# Patient Record
Sex: MALE | Race: WHITE | Employment: OTHER | URBAN - METROPOLITAN AREA
[De-identification: names, ages, dates, MRNs, and addresses within clinical notes are randomized per-mention and may not be internally consistent; named-entity substitution may affect disease eponyms.]

---

## 2020-07-30 ENCOUNTER — HOSPITAL ENCOUNTER (EMERGENCY)
Age: 85
Discharge: HOME | End: 2020-07-30
Payer: MEDICARE

## 2020-07-30 VITALS
SYSTOLIC BLOOD PRESSURE: 178 MMHG | TEMPERATURE: 98.2 F | WEIGHT: 160 LBS | DIASTOLIC BLOOD PRESSURE: 67 MMHG | HEART RATE: 63 BPM | OXYGEN SATURATION: 99 % | BODY MASS INDEX: 23.7 KG/M2 | RESPIRATION RATE: 20 BRPM | HEIGHT: 69 IN

## 2020-07-30 PROCEDURE — 10120 INC&RMVL FB SUBQ TISS SMPL: CPT

## 2020-07-30 PROCEDURE — 2500000003 HC RX 250 WO HCPCS: Performed by: NURSE PRACTITIONER

## 2020-07-30 PROCEDURE — 99213 OFFICE O/P EST LOW 20 MIN: CPT

## 2020-07-30 PROCEDURE — 96372 THER/PROPH/DIAG INJ SC/IM: CPT

## 2020-07-30 RX ORDER — CEPHALEXIN 500 MG/1
500 CAPSULE ORAL 4 TIMES DAILY
Qty: 28 CAPSULE | Refills: 0 | Status: SHIPPED | OUTPATIENT
Start: 2020-07-30 | End: 2020-08-06

## 2020-07-30 RX ORDER — METOPROLOL SUCCINATE 25 MG/1
25 TABLET, EXTENDED RELEASE ORAL DAILY
COMMUNITY

## 2020-07-30 RX ORDER — DORZOLAMIDE HYDROCHLORIDE AND TIMOLOL MALEATE PRESERVATIVE FREE 20; 5 MG/ML; MG/ML
SOLUTION/ DROPS OPHTHALMIC
COMMUNITY

## 2020-07-30 RX ORDER — ATORVASTATIN CALCIUM 40 MG/1
40 TABLET, FILM COATED ORAL DAILY
COMMUNITY

## 2020-07-30 RX ORDER — LIDOCAINE HYDROCHLORIDE 10 MG/ML
5 INJECTION, SOLUTION INFILTRATION; PERINEURAL ONCE
Status: COMPLETED | OUTPATIENT
Start: 2020-07-30 | End: 2020-07-30

## 2020-07-30 RX ORDER — LISINOPRIL 20 MG/1
20 TABLET ORAL DAILY
COMMUNITY

## 2020-07-30 RX ORDER — ALLOPURINOL 300 MG/1
300 TABLET ORAL DAILY
COMMUNITY

## 2020-07-30 RX ORDER — ASPIRIN 81 MG/1
81 TABLET ORAL DAILY
COMMUNITY

## 2020-07-30 RX ADMIN — LIDOCAINE HYDROCHLORIDE 5 ML: 10 INJECTION, SOLUTION INFILTRATION; PERINEURAL at 17:28

## 2020-07-30 ASSESSMENT — PAIN SCALES - GENERAL: PAINLEVEL_OUTOF10: 0

## 2020-07-30 NOTE — ED NOTES
Wound cleansed with NSS. Triple antibiotic ointment and Bandaid applied.      JOHANNA Acevedo  99/49/02 [id number]

## 2020-08-01 NOTE — ED PROVIDER NOTES
Department of Emergency Medicine  [address] [place] [address]  Provider Note  Admit Date/Time: 7/30/2020  4:45 PM  Room: 06/06  MRN: [id number]  Chief Complaint: Foreign Body in Skin (Has wooden splinter from fence post under finger nail on left 3rd finger)       History of Present Illness   Source of history provided by:  patient. History/Exam Limitations: none. Evelyn Liz is a 80 y.o. male who has a past medical history of:   Past Medical History:   Diagnosis Date    CAD (coronary artery disease)     Gout     Hyperlipidemia     Hypertension     presents to the urgent care center by private car for a splinter under the fingernail on his left third finger. This happened just prior to arrival.  He said he just put his hand on a fence post and somehow it went into his finger. He was not able to get it out so he came in for evaluation. He has no other complaints or problems    ROS    Pertinent positives and negatives are stated within HPI, all other systems reviewed and are negative. Past Surgical History:   Procedure Laterality Date    COLONOSCOPY      CORONARY ANGIOPLASTY WITH STENT PLACEMENT      EYE SURGERY      IR ANGXPTA FEM POP W STENT      PROSTATE BIOPSY      ROTATOR CUFF REPAIR     Social History:  reports that he has never smoked. He has never used smokeless tobacco.  Family History: family history is not on file. Allergies: Patient has no known allergies. Physical Exam   Oxygen Saturation Interpretation: Normal.   ED Triage Vitals [07/30/20 1648]   BP Temp Temp Source Pulse Resp SpO2 Height Weight   (!) 178/67 98.2 °F (36.8 °C) Infrared 63 20 99 % 5' 8.5\" (1.74 m) 160 lb (72.6 kg)       Physical Exam  · Constitutional/General: Alert and oriented x3, well appearing, non toxic in NAD  · HEENT:  NC/NT.,  Airway patent. · Neck: Supple, full ROM,   · Respiratory: . Not in respiratory distress  · CV:  Regular rate. Regular rhythm.      · Musculoskeletal: Moves all extremities x 4.   · Integument: skin warm and dry. No rashes. He does have a splinter lodged under the nail on the left third finger. · Lymphatic: no lymphadenopathy noted  · Neurologic: GCS 15, no focal deficits, symmetric strength 5/5 in the upper and lower extremities bilaterally  · Psychiatric: Normal Affect    Lab / Imaging Results   (All laboratory and radiology results have been personally reviewed by myself)  Labs:  No results found for this visit on 07/30/20. Imaging: All Radiology results interpreted by Radiologist unless otherwise noted. No orders to display       ED Course / Medical Decision Making     Medications   lidocaine 1 % injection 5 mL (5 mLs Intradermal Given 7/30/20 1728)            MDM:   Patient here for evaluation of a splinter under his nail. Is not able to get it out. PROCEDURE  8/1/20       Time: 1700    FOREIGN BODY REMOVAL  Risks, benefits and alternatives (for applicable procedures below) described. Performed By: THANG Keenan CNP. Indication:  splinter. Location:   Subungual left third finger . Informed consent obtained: The patient provided verbal consent for this procedure. .  Prep: The skin was cleansed with povidone iodine and draped in a sterile fashion. Anesthetic: The wound area was anesthetized with Lidocaine 1% without epinephrine. -- by digital block. Foreign Body was: A wedge was cut into the nail and the splinter was removed with splinter forceps removed using splinter forceps and had the appearance of wood. Ultrasound Guidance:   not used. Complications:  None. Patient tolerated the procedure well. Advised him if he develops any redness drainage or swelling at the site he needs to get reevaluated right away he should follow-up with his doctor for wound recheck I did put him on Keflex. He said he had a tetanus shot 3 years ago so that did not need updated.   The wound was thoroughly irrigated after the procedure, and antibiotic ointment and a dressing was applied by the LPN        Counseling:    I have  spoken with the patient and discussed todays results, in addition to providing specific details for the plan of care and counseling regarding the diagnosis and prognosis. Questions are answered at this time and they are agreeable with the plan. Assessment      1. Splinter    2. FOREIGN BODY REMOVAL  Plan   Discharge to home and advised to contact Kevin Garcia MD  [address]. [facility] [phone]    Schedule an appointment as soon as possible for a visit in 1 day  For wound re-check   Patient condition is good    New Medications     Discharge Medication List as of 7/30/2020  6:07 PM      START taking these medications    Details   cephALEXin (KEFLEX) 500 MG capsule Take 1 capsule by mouth 4 times daily for 7 days, Disp-28 capsule,R-0Print           Electronically signed by THANG Kelly CNP   DD: 8/1/20  **This report was transcribed using voice recognition software. Every effort was made to ensure accuracy; however, inadvertent computerized transcription errors may be present.   END OF ED PROVIDER NOTE     THANG Kelly CNP  08/01/20 2790

## 2021-01-29 ENCOUNTER — IMMUNIZATION (OUTPATIENT)
Dept: PRIMARY CARE CLINIC | Age: 86
End: 2021-01-29
Payer: MEDICARE

## 2021-01-29 PROCEDURE — 91301 COVID-19, MODERNA VACCINE 100MCG/0.5ML DOSE: CPT | Performed by: PHYSICIAN ASSISTANT

## 2021-01-29 PROCEDURE — 0011A COVID-19, MODERNA VACCINE 100MCG/0.5ML DOSE: CPT | Performed by: PHYSICIAN ASSISTANT

## 2021-02-26 ENCOUNTER — IMMUNIZATION (OUTPATIENT)
Dept: PRIMARY CARE CLINIC | Age: 86
End: 2021-02-26
Payer: MEDICARE

## 2021-02-26 PROCEDURE — 91301 COVID-19, MODERNA VACCINE 100MCG/0.5ML DOSE: CPT | Performed by: PHYSICIAN ASSISTANT

## 2021-02-26 PROCEDURE — 0012A COVID-19, MODERNA VACCINE 100MCG/0.5ML DOSE: CPT | Performed by: PHYSICIAN ASSISTANT

## 2021-11-30 ENCOUNTER — IMMUNIZATION (OUTPATIENT)
Dept: PRIMARY CARE CLINIC | Age: 86
End: 2021-11-30
Payer: MEDICARE

## 2021-11-30 PROCEDURE — 0064A COVID-19, MODERNA BOOSTER VACCINE 0.25ML DOSE: CPT | Performed by: STUDENT IN AN ORGANIZED HEALTH CARE EDUCATION/TRAINING PROGRAM

## 2021-11-30 PROCEDURE — 91306 COVID-19, MODERNA BOOSTER VACCINE 0.25ML DOSE: CPT | Performed by: STUDENT IN AN ORGANIZED HEALTH CARE EDUCATION/TRAINING PROGRAM

## 2022-07-18 ENCOUNTER — TELEPHONE (OUTPATIENT)
Dept: ADMINISTRATIVE | Age: 87
End: 2022-07-18

## 2022-07-18 NOTE — TELEPHONE ENCOUNTER
Pt's wife Soraya Garcia called to see if and when they can get in for referral Unspecified hearing loss. Unspecified ear. Wife stated she feels wax is stuck in the eardrum. Is this referral ok to schedule?

## 2022-08-17 ENCOUNTER — OFFICE VISIT (OUTPATIENT)
Dept: ENT CLINIC | Age: 87
End: 2022-08-17
Payer: MEDICARE

## 2022-08-17 ENCOUNTER — PROCEDURE VISIT (OUTPATIENT)
Dept: AUDIOLOGY | Age: 87
End: 2022-08-17
Payer: MEDICARE

## 2022-08-17 VITALS
HEIGHT: 69 IN | SYSTOLIC BLOOD PRESSURE: 181 MMHG | BODY MASS INDEX: 21.37 KG/M2 | HEART RATE: 54 BPM | WEIGHT: 144.3 LBS | DIASTOLIC BLOOD PRESSURE: 65 MMHG

## 2022-08-17 DIAGNOSIS — H90.3 SENSORINEURAL HEARING LOSS (SNHL) OF BOTH EARS: Primary | ICD-10-CM

## 2022-08-17 DIAGNOSIS — H91.8X9 ASYMMETRICAL HEARING LOSS: ICD-10-CM

## 2022-08-17 DIAGNOSIS — H69.83 DYSFUNCTION OF BOTH EUSTACHIAN TUBES: ICD-10-CM

## 2022-08-17 DIAGNOSIS — H90.3 ASYMMETRIC SNHL (SENSORINEURAL HEARING LOSS): Primary | ICD-10-CM

## 2022-08-17 PROCEDURE — 69210 REMOVE IMPACTED EAR WAX UNI: CPT | Performed by: OTOLARYNGOLOGY

## 2022-08-17 PROCEDURE — 92557 COMPREHENSIVE HEARING TEST: CPT | Performed by: AUDIOLOGIST

## 2022-08-17 PROCEDURE — 92567 TYMPANOMETRY: CPT | Performed by: AUDIOLOGIST

## 2022-08-17 PROCEDURE — 1123F ACP DISCUSS/DSCN MKR DOCD: CPT | Performed by: OTOLARYNGOLOGY

## 2022-08-17 PROCEDURE — 1036F TOBACCO NON-USER: CPT | Performed by: OTOLARYNGOLOGY

## 2022-08-17 PROCEDURE — G8420 CALC BMI NORM PARAMETERS: HCPCS | Performed by: OTOLARYNGOLOGY

## 2022-08-17 PROCEDURE — G8427 DOCREV CUR MEDS BY ELIG CLIN: HCPCS | Performed by: OTOLARYNGOLOGY

## 2022-08-17 PROCEDURE — 99204 OFFICE O/P NEW MOD 45 MIN: CPT | Performed by: OTOLARYNGOLOGY

## 2022-08-17 RX ORDER — APIXABAN 5 MG/1
1 TABLET, FILM COATED ORAL 2 TIMES DAILY
COMMUNITY
Start: 2022-07-25

## 2022-08-17 RX ORDER — M-VIT,TX,IRON,MINS/CALC/FOLIC 27MG-0.4MG
1 TABLET ORAL DAILY
COMMUNITY

## 2022-08-17 ASSESSMENT — ENCOUNTER SYMPTOMS
CHOKING: 0
SINUS PAIN: 0
SINUS PRESSURE: 0
TROUBLE SWALLOWING: 0
VOICE CHANGE: 0
RHINORRHEA: 0
SORE THROAT: 0
GASTROINTESTINAL NEGATIVE: 1
WHEEZING: 0
COUGH: 0
COLOR CHANGE: 0
STRIDOR: 0

## 2022-08-17 ASSESSMENT — VISUAL ACUITY: OU: 1

## 2022-08-17 NOTE — PROGRESS NOTES
This patient was referred for audiometric/tympanometric testing by Dr. Ananda Figueredo due to hearing loss. 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 Asymmetric hearing noted and discussed with physician. Reliability was good. Speech reception thresholds were in good agreement with the pure tone averages, bilaterally. Speech discrimination scores were fair, bilaterally. Tympanometry revealed normal middle ear peak pressure and compliance, in the left ear. Right ear revealed shallow tympanogram ( 0.2ml)     The results were reviewed with the patient and physician. Recommendations for follow up will be made pending physician consult.     Rodríguez Henriquez/CCC-A  [place] Lic # [id number]

## 2022-08-17 NOTE — PROGRESS NOTES
[address] [facility] Otolaryngology  Dr. Marisa Rodríguez. ANA De Los Santos Ms.Ed. New Consult       Patient Name:  Nina Bills  :  1930     CHIEF C/O:    Chief Complaint   Patient presents with    Hearing Problem     Right side per pt is plugged       HISTORY OBTAINED FROM:  patient    HISTORY OF PRESENT ILLNESS:       Mike Garcia is a 80y.o. year old male, here today for hearing evaluation. Pt reports difficulty hearing. Routinely gets his ears cleaned by PCP. Recently noticed that even after cleaning hearing has been worse. 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 Denies dizziness, denies visual changes, denies drainage from ears. Never smoker.        Past Medical History:   Diagnosis Date    CAD (coronary artery disease)     Gout     Hyperlipidemia     Hypertension      Past Surgical History:   Procedure Laterality Date    COLONOSCOPY      CORONARY ANGIOPLASTY WITH STENT PLACEMENT      EYE SURGERY      IR ANGXPTA FEM POP W STENT      PROSTATE BIOPSY      ROTATOR CUFF REPAIR         Current Outpatient Medications:     ELIQUIS 5 MG TABS tablet, Take 1 tablet by mouth in the morning and at bedtime, Disp: , Rfl:     Multiple Vitamins-Minerals (THERAPEUTIC MULTIVITAMIN-MINERALS) tablet, Take 1 tablet by mouth daily, Disp: , Rfl:     aspirin 81 MG EC tablet, Take 81 mg by mouth daily, Disp: , Rfl:     allopurinol (ZYLOPRIM) 300 MG tablet, Take 300 mg by mouth daily, Disp: , Rfl:     metoprolol succinate (TOPROL XL) 25 MG extended release tablet, Take 25 mg by mouth daily, Disp: , Rfl:     lisinopril (PRINIVIL;ZESTRIL) 20 MG tablet, Take 20 mg by mouth daily, Disp: , Rfl:     atorvastatin (LIPITOR) 40 MG tablet, Take 40 mg by mouth daily, Disp: , Rfl:     Dorzolamide HCl-Timolol Mal PF 2-0.5 % SOLN, Apply to eye, Disp: , Rfl:     brimonidine (ALPHAGAN P) 0.1 % SOLN, 1 drop every 8 hours, Disp: , Rfl:     bimatoprost (LUMIGAN) 0.01 % SOLN ophthalmic drops, Place 1 drop into both eyes nightly, Disp: , Rfl:   Patient has no known allergies. Social History     Tobacco Use    Smoking status: Never    Smokeless tobacco: Never   Substance Use Topics    Alcohol use: Yes     Comment: blue moon    Drug use: Never     No family history on file. Review of Systems   Constitutional:  Negative for activity change, fatigue and fever. HENT:  Positive for hearing loss. Negative for congestion, ear discharge, ear pain, nosebleeds, postnasal drip, rhinorrhea, sinus pressure, sinus pain, sore throat, tinnitus, trouble swallowing and voice change. Respiratory:  Negative for cough, choking, wheezing and stridor. Cardiovascular:  Negative for chest pain. Gastrointestinal: Negative. Genitourinary: Negative. Skin:  Negative for color change and rash. Neurological:  Negative for speech difficulty, light-headedness, numbness and headaches. Hematological:  Negative for adenopathy. Psychiatric/Behavioral:  Negative for behavioral problems. BP (!) 181/65 (Site: Left Upper Arm, Position: Sitting, Cuff Size: Medium Adult)   Pulse 54   Ht 5' 8.5\" (1.74 m)   Wt 144 lb 4.8 oz (65.5 kg)   BMI 21.62 kg/m²   Physical Exam  Constitutional:       Appearance: Normal appearance. He is normal weight. HENT:      Head: Normocephalic and atraumatic. Right Ear: Tympanic membrane, ear canal and external ear normal. No drainage. Left Ear: Tympanic membrane, ear canal and external ear normal. No drainage. There is impacted cerumen. Nose: Nose normal. No nasal deformity or septal deviation. Mouth/Throat:      Mouth: Mucous membranes are moist.   Eyes:      General: Lids are normal. Vision grossly intact. Extraocular Movements: Extraocular movements intact. Conjunctiva/sclera: Conjunctivae normal.      Pupils: Pupils are equal, round, and reactive to light. Cardiovascular:      Rate and Rhythm: Normal rate.    Pulmonary:      Effort: Pulmonary effort is normal.   Musculoskeletal:         General: Normal range of

## 2022-09-22 ENCOUNTER — TELEPHONE (OUTPATIENT)
Dept: ENT CLINIC | Age: 87
End: 2022-09-22

## 2022-09-22 NOTE — TELEPHONE ENCOUNTER
Received release of Information via fax from [facility] Lanette Frazier.), sent last audiology report electronically through Epic. DALE fax scanned into media.

## 2022-10-14 ENCOUNTER — IMMUNIZATION (OUTPATIENT)
Dept: PRIMARY CARE CLINIC | Age: 87
End: 2022-10-14
Payer: MEDICARE

## 2022-10-14 PROCEDURE — 91313 COVID-19, MODERNA BIVALENT BOOSTER, (AGE 18Y+), IM, 50 MCG/0.5 ML: CPT | Performed by: SURGERY

## 2022-10-14 PROCEDURE — 0134A COVID-19, MODERNA BIVALENT BOOSTER, (AGE 18Y+), IM, 50 MCG/0.5 ML: CPT | Performed by: SURGERY

## 2023-09-25 ENCOUNTER — OFFICE VISIT (OUTPATIENT)
Dept: ENT CLINIC | Age: 88
End: 2023-09-25
Payer: MEDICARE

## 2023-09-25 VITALS
BODY MASS INDEX: 20.26 KG/M2 | HEART RATE: 59 BPM | DIASTOLIC BLOOD PRESSURE: 89 MMHG | SYSTOLIC BLOOD PRESSURE: 141 MMHG | HEIGHT: 69 IN | WEIGHT: 136.8 LBS

## 2023-09-25 DIAGNOSIS — H61.23 BILATERAL IMPACTED CERUMEN: Primary | ICD-10-CM

## 2023-09-25 PROCEDURE — 69210 REMOVE IMPACTED EAR WAX UNI: CPT | Performed by: NURSE PRACTITIONER

## 2023-09-25 NOTE — PROGRESS NOTES
Subjective:      Patient ID:  Yaneli Botello is a 80 y.o. male. HPI:    Pt presents with a history of cerumen impaction removal.   The patients ear was last cleaned 1 year ago. The patient was not using ear drops to loosen wax immediately prior to this visit. Hearing aids: yes      Past Medical History:   Diagnosis Date    CAD (coronary artery disease)     Gout     Hyperlipidemia     Hypertension      Past Surgical History:   Procedure Laterality Date    COLONOSCOPY      CORONARY ANGIOPLASTY WITH STENT PLACEMENT      EYE SURGERY      IR ANGXPTA FEM POP W STENT      PROSTATE BIOPSY      ROTATOR CUFF REPAIR       History reviewed. No pertinent family history. Social History     Socioeconomic History    Marital status:      Spouse name: None    Number of children: None    Years of education: None    Highest education level: None   Tobacco Use    Smoking status: Never    Smokeless tobacco: Never   Substance and Sexual Activity    Alcohol use: Yes     Comment: blue moon    Drug use: Never     No Known Allergies    Review of Systems            Objective:     Vitals:    09/25/23 1425   BP: (!) 141/89   Pulse: 59     Physical Exam        Cerumen removal     Auditory canal(s) both ears partially obstructed with cerumen. A microscope was used . Cerumen was gently removed using soft plastic curette. Tympanic membranes are intact following the procedure. Auditory canals appear normal.            Assessment:       Diagnosis Orders   1. Bilateral impacted cerumen  DC REMOVAL IMPACTED CERUMEN INSTRUMENTATION UNILAT                 Plan:      Bilateral cerumen impactions removed without difficulty. Patient tolerated well. Patient has had audio performed within the last 1 year at his hearing aid provider and will continue to follow-up with them for further testing in the future.   He will otherwise follow-up in 1 year for repeat cerumen removal.  He will call for any new or worsening symptoms prior to his next

## 2023-10-07 ENCOUNTER — HOSPITAL ENCOUNTER (EMERGENCY)
Age: 88
Discharge: HOME | End: 2023-10-07
Payer: MEDICARE

## 2023-10-07 ENCOUNTER — APPOINTMENT (OUTPATIENT)
Dept: GENERAL RADIOLOGY | Age: 88
End: 2023-10-07
Payer: MEDICARE

## 2023-10-07 VITALS
BODY MASS INDEX: 19.48 KG/M2 | RESPIRATION RATE: 18 BRPM | WEIGHT: 130 LBS | DIASTOLIC BLOOD PRESSURE: 75 MMHG | SYSTOLIC BLOOD PRESSURE: 151 MMHG | HEART RATE: 54 BPM | TEMPERATURE: 99 F | OXYGEN SATURATION: 100 %

## 2023-10-07 DIAGNOSIS — M79.643 PAIN OF HAND, UNSPECIFIED LATERALITY: Primary | ICD-10-CM

## 2023-10-07 DIAGNOSIS — L03.119 CELLULITIS OF UPPER EXTREMITY, UNSPECIFIED LATERALITY: ICD-10-CM

## 2023-10-07 PROCEDURE — 99211 OFF/OP EST MAY X REQ PHY/QHP: CPT

## 2023-10-07 PROCEDURE — 73130 X-RAY EXAM OF HAND: CPT

## 2023-10-07 RX ORDER — CEPHALEXIN 500 MG/1
500 CAPSULE ORAL 3 TIMES DAILY
Qty: 21 CAPSULE | Refills: 0 | Status: SHIPPED | OUTPATIENT
Start: 2023-10-07 | End: 2023-10-14

## 2023-10-07 RX ORDER — CEPHALEXIN 500 MG/1
500 CAPSULE ORAL 3 TIMES DAILY
Qty: 21 CAPSULE | Refills: 0 | Status: SHIPPED | OUTPATIENT
Start: 2023-10-07 | End: 2023-10-07 | Stop reason: SDUPTHER

## 2023-10-07 RX ORDER — METHYLPREDNISOLONE 4 MG/1
TABLET ORAL
Qty: 21 TABLET | Refills: 0 | Status: SHIPPED | OUTPATIENT
Start: 2023-10-07 | End: 2023-10-07 | Stop reason: SDUPTHER

## 2023-10-07 RX ORDER — METHYLPREDNISOLONE 4 MG/1
TABLET ORAL
Qty: 21 TABLET | Refills: 0 | Status: SHIPPED | OUTPATIENT
Start: 2023-10-07

## 2023-10-07 ASSESSMENT — PAIN - FUNCTIONAL ASSESSMENT: PAIN_FUNCTIONAL_ASSESSMENT: NONE - DENIES PAIN

## 2023-10-07 NOTE — ED PROVIDER NOTES
rhythm. Right radial pulse normal capillary refill good movement of his fingers. Musculoskeletal: Moves all extremities x 4.'s of the right hand at the base of the thumb and the first 3 fingers is erythematous  and swollen. It is also warm to touch. Integument: skin warm and dry. No rashes. Lymphatic: no lymphadenopathy noted  Neurologic: GCS 15, no focal deficits, symmetric strength 5/5 in the upper and lower extremities bilaterally  Psychiatric: Normal Affect    Lab / Imaging Results   (All laboratory and radiology results have been personally reviewed by myself)  Labs:  No results found for this visit on 10/07/23. Imaging: All Radiology results interpreted by Radiologist unless otherwise noted. XR HAND RIGHT (MIN 3 VIEWS)   Final Result   No sign of acute fracture or dislocation      Moderate diffuse osteopenia. Mild degenerative changes as described above. ED Course / Medical Decision Making   Medications - No data to display       Consult(s):   None      MDM:   I did obtain an x-ray of his hand to rule out fracture and it was negative. His  hand is red and swollen and warm. He has  inflammation of the joints and cellulitis so I did put him on a Medrol Dosepak and some Keflex and advised him to follow-up with his doctor for recheck. Assessment      1. Pain of hand, unspecified laterality    2.  Cellulitis of upper extremity, unspecified laterality      Plan   Discharge to home and advised to contact Aftab Branch MD  [address] [place] [address] [phone]    Schedule an appointment as soon as possible for a visit      Patient condition is good    New Medications     Current Discharge Medication List        START taking these medications    Details   cephALEXin (KEFLEX) 500 MG capsule Take 1 capsule by mouth 3 times daily for 7 days  Qty: 21 capsule, Refills: 0      methylPREDNISolone (MEDROL, PRASHANT,) 4 MG tablet Take as directed on package insert days 1-6  Qty: 21

## 2025-05-04 ENCOUNTER — HOSPITAL ENCOUNTER (EMERGENCY)
Age: 89
Discharge: HOME | End: 2025-05-04
Attending: STUDENT IN AN ORGANIZED HEALTH CARE EDUCATION/TRAINING PROGRAM
Payer: MEDICARE

## 2025-05-04 ENCOUNTER — APPOINTMENT (OUTPATIENT)
Dept: CT IMAGING | Age: 89
End: 2025-05-04
Payer: MEDICARE

## 2025-05-04 VITALS
TEMPERATURE: 98.4 F | DIASTOLIC BLOOD PRESSURE: 71 MMHG | RESPIRATION RATE: 16 BRPM | OXYGEN SATURATION: 94 % | SYSTOLIC BLOOD PRESSURE: 139 MMHG | HEART RATE: 94 BPM

## 2025-05-04 DIAGNOSIS — S32.000A COMPRESSION FRACTURE OF LUMBAR VERTEBRA, UNSPECIFIED LUMBAR VERTEBRAL LEVEL, INITIAL ENCOUNTER (HCC): Primary | ICD-10-CM
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
## 2025-05-04 PROCEDURE — 72192 CT PELVIS W/O DYE: CPT

## 2025-05-04 PROCEDURE — 72131 CT LUMBAR SPINE W/O DYE: CPT

## 2025-05-04 PROCEDURE — 6370000000 HC RX 637 (ALT 250 FOR IP): Performed by: STUDENT IN AN ORGANIZED HEALTH CARE EDUCATION/TRAINING PROGRAM

## 2025-05-04 PROCEDURE — 99284 EMERGENCY DEPT VISIT MOD MDM: CPT

## 2025-05-04 RX ORDER — TRAMADOL HYDROCHLORIDE 50 MG/1
50 TABLET ORAL ONCE
Status: COMPLETED | OUTPATIENT
Start: 2025-05-04 | End: 2025-05-04

## 2025-05-04 RX ORDER — LIDOCAINE 4 G/G
1 PATCH TOPICAL DAILY
Status: DISCONTINUED | OUTPATIENT
Start: 2025-05-04 | End: 2025-05-04 | Stop reason: HOSPADM

## 2025-05-04 RX ORDER — TRAMADOL HYDROCHLORIDE 50 MG/1
50 TABLET ORAL ONCE
Status: DISCONTINUED | OUTPATIENT
Start: 2025-05-04 | End: 2025-05-04 | Stop reason: HOSPADM

## 2025-05-04 RX ORDER — TRAMADOL HYDROCHLORIDE 50 MG/1
50 TABLET ORAL EVERY 6 HOURS PRN
Qty: 12 TABLET | Refills: 0 | Status: SHIPPED | OUTPATIENT
Start: 2025-05-04 | End: 2025-05-07

## 2025-05-04 RX ORDER — LIDOCAINE 4 G/G
1 PATCH TOPICAL DAILY
Qty: 30 PATCH | Refills: 0 | Status: SHIPPED | OUTPATIENT
Start: 2025-05-04 | End: 2025-06-03

## 2025-05-04 RX ADMIN — TRAMADOL HYDROCHLORIDE 50 MG: 50 TABLET, COATED ORAL at 16:21

## 2025-05-04 ASSESSMENT — LIFESTYLE VARIABLES
HOW OFTEN DO YOU HAVE A DRINK CONTAINING ALCOHOL: NEVER
HOW MANY STANDARD DRINKS CONTAINING ALCOHOL DO YOU HAVE ON A TYPICAL DAY: PATIENT DOES NOT DRINK

## 2025-05-04 NOTE — ED PROVIDER NOTES
stent; Eye surgery; Colonoscopy; Rotator cuff repair; Prostate biopsy; and IR ANGIOPLASTY FEM POP W STENT.    Social History:  reports that he has never smoked. He has never used smokeless tobacco. He reports current alcohol use. He reports that he does not use drugs.    Family History: family history is not on file.     The patient’s home medications have been reviewed.    Allergies: Patient has no known allergies.    -------------------------------------------------- RESULTS -------------------------------------------------  Labs:  No results found for this visit on 05/04/25.    Radiology:  CT LUMBAR SPINE WO CONTRAST   Final Result   1. Mild compression deformity along the superior endplate of T12 could be   acute and there is minimal retropulsion into the spinal canal.   2. No acute osseous abnormality of the pelvis.   3. A large right inguinal hernia extending into the scrotum contains   nonobstructed bowel loops and the appendix.   4. 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      RECOMMENDATIONS:   Nonemergent vascular consultation.         CT PELVIS WO CONTRAST Additional Contrast? None   Final Result   1. Mild compression deformity along the superior endplate of T12 could be   acute and there is minimal retropulsion into the spinal canal.   2. No acute osseous abnormality of the pelvis.   3. A large right inguinal hernia extending into the scrotum contains   nonobstructed bowel loops and the appendix.   4. 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      RECOMMENDATIONS:   Nonemergent vascular consultation.             ------------------------- NURSING NOTES AND VITALS REVIEWED ---------------------------  Date / Time Roomed:  5/4/2025  3:57 PM  ED Bed Assignment:  19/19    The nursing notes within the ED

## 2025-05-05 ENCOUNTER — TELEPHONE (OUTPATIENT)
Dept: VASCULAR SURGERY | Age: 89
End: 2025-05-05

## 2025-05-05 ENCOUNTER — CLINICAL DOCUMENTATION (OUTPATIENT)
Dept: VASCULAR SURGERY | Age: 89
End: 2025-05-05

## 2025-05-05 PROBLEM — I72.3 ILIAC ARTERY ANEURYSM, BILATERAL: Status: ACTIVE | Noted: 2025-05-05

## 2025-05-05 PROBLEM — I71.43 INFRARENAL ABDOMINAL AORTIC ANEURYSM (AAA) WITHOUT RUPTURE: Status: ACTIVE | Noted: 2025-05-05

## 2025-05-05 ASSESSMENT — ENCOUNTER SYMPTOMS
DIARRHEA: 0
BACK PAIN: 1
CHEST TIGHTNESS: 0
VOMITING: 0
ABDOMINAL DISTENTION: 0
ABDOMINAL PAIN: 0
PHOTOPHOBIA: 0
SHORTNESS OF BREATH: 0
COUGH: 0
NAUSEA: 0

## 2025-05-05 NOTE — DISCHARGE INSTRUCTIONS
CT LUMBAR SPINE WO CONTRAST   Final Result   1. Mild compression deformity along the superior endplate of T12 could be   acute and there is minimal retropulsion into the spinal canal.   2. No acute osseous abnormality of the pelvis.   3. A large right inguinal hernia extending into the scrotum contains   nonobstructed bowel loops and the appendix.   4. 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      RECOMMENDATIONS:   Nonemergent vascular consultation.         CT PELVIS WO CONTRAST Additional Contrast? None   Final Result   1. Mild compression deformity along the superior endplate of T12 could be   acute and there is minimal retropulsion into the spinal canal.   2. No acute osseous abnormality of the pelvis.   3. A large right inguinal hernia extending into the scrotum contains   nonobstructed bowel loops and the appendix.   4. 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      RECOMMENDATIONS:   Nonemergent vascular consultation.

## 2025-05-05 NOTE — TELEPHONE ENCOUNTER
Dr. Arredondo received inbox message from ER regarding iliac artery aneurysm.   Attempted to call patient to schedule appointment, call could not be completed at this time.

## 2025-05-05 NOTE — PROGRESS NOTES
Emergency room sent inbox notes, ER note itself is incomplete, CT scan of the lumbosacral spine and the pelvis were reviewed, infrarenal abdominal aortic-ism 3 cm diameter, right common iliac artery 4 x 4.8 cm diameter left common iliac, 1.8 x 2.4 send diameter    Will request our office to contact the patient and the family regarding appointment in the office for follow-up

## 2025-05-06 NOTE — TELEPHONE ENCOUNTER
Called patient, call could not be completed at this time.   Left message on daughter's voicemail for a call back to schedule appointment with Dr. Arredondo.

## 2025-05-07 ENCOUNTER — TELEPHONE (OUTPATIENT)
Dept: VASCULAR SURGERY | Age: 89
End: 2025-05-07

## 2025-05-07 NOTE — TELEPHONE ENCOUNTER
Spoke with patient's daughter, Becky Robertson.   She states they are in process of moving her dad to assisted living this week.  Also, her mother just  on 25.   She said Dr. Lopez recommended he get evaluated, also, for the aneurysm.  Due to patient's age she is unsure of scheduling at this time and will discuss with her dad and family members. She will call back if she wishes to schedule.